# Patient Record
Sex: MALE | Race: WHITE | Employment: FULL TIME | URBAN - METROPOLITAN AREA
[De-identification: names, ages, dates, MRNs, and addresses within clinical notes are randomized per-mention and may not be internally consistent; named-entity substitution may affect disease eponyms.]

---

## 2022-05-22 ENCOUNTER — HOSPITAL ENCOUNTER (INPATIENT)
Age: 37
LOS: 1 days | Discharge: HOME | DRG: 660 | End: 2022-05-23
Attending: STUDENT IN AN ORGANIZED HEALTH CARE EDUCATION/TRAINING PROGRAM | Admitting: INTERNAL MEDICINE
Payer: COMMERCIAL

## 2022-05-22 ENCOUNTER — ANESTHESIA EVENT (OUTPATIENT)
Dept: OPERATING ROOM | Age: 37
DRG: 660 | End: 2022-05-22
Payer: COMMERCIAL

## 2022-05-22 ENCOUNTER — ANESTHESIA (OUTPATIENT)
Dept: OPERATING ROOM | Age: 37
DRG: 660 | End: 2022-05-22
Payer: COMMERCIAL

## 2022-05-22 ENCOUNTER — APPOINTMENT (OUTPATIENT)
Dept: CT IMAGING | Age: 37
DRG: 660 | End: 2022-05-22
Payer: COMMERCIAL

## 2022-05-22 DIAGNOSIS — N20.1 URETEROLITHIASIS: Primary | ICD-10-CM

## 2022-05-22 DIAGNOSIS — N17.9 AKI (ACUTE KIDNEY INJURY) (HCC): ICD-10-CM

## 2022-05-22 PROBLEM — N13.2 HYDRONEPHROSIS WITH RENAL CALCULOUS OBSTRUCTION: Status: ACTIVE | Noted: 2022-05-22

## 2022-05-22 LAB
ANION GAP SERPL CALCULATED.3IONS-SCNC: 14 MMOL/L (ref 3–16)
BASOPHILS ABSOLUTE: 0.1 K/UL (ref 0–0.2)
BASOPHILS RELATIVE PERCENT: 0.6 %
BILIRUBIN URINE: NEGATIVE
BLOOD, URINE: ABNORMAL
BUN BLDV-MCNC: 22 MG/DL (ref 7–20)
CALCIUM SERPL-MCNC: 9.6 MG/DL (ref 8.3–10.6)
CHLORIDE BLD-SCNC: 103 MMOL/L (ref 99–110)
CLARITY: CLEAR
CO2: 21 MMOL/L (ref 21–32)
COLOR: YELLOW
CREAT SERPL-MCNC: 1.7 MG/DL (ref 0.9–1.3)
EOSINOPHILS ABSOLUTE: 0.2 K/UL (ref 0–0.6)
EOSINOPHILS RELATIVE PERCENT: 2.2 %
GFR AFRICAN AMERICAN: 56
GFR NON-AFRICAN AMERICAN: 46
GLUCOSE BLD-MCNC: 116 MG/DL (ref 70–99)
GLUCOSE URINE: NEGATIVE MG/DL
HCT VFR BLD CALC: 39.1 % (ref 40.5–52.5)
HEMOGLOBIN: 13 G/DL (ref 13.5–17.5)
KETONES, URINE: NEGATIVE MG/DL
LEUKOCYTE ESTERASE, URINE: NEGATIVE
LYMPHOCYTES ABSOLUTE: 1.3 K/UL (ref 1–5.1)
LYMPHOCYTES RELATIVE PERCENT: 12 %
MCH RBC QN AUTO: 27.6 PG (ref 26–34)
MCHC RBC AUTO-ENTMCNC: 33.3 G/DL (ref 31–36)
MCV RBC AUTO: 82.8 FL (ref 80–100)
MICROSCOPIC EXAMINATION: YES
MONOCYTES ABSOLUTE: 0.5 K/UL (ref 0–1.3)
MONOCYTES RELATIVE PERCENT: 4.6 %
NEUTROPHILS ABSOLUTE: 8.4 K/UL (ref 1.7–7.7)
NEUTROPHILS RELATIVE PERCENT: 80.6 %
NITRITE, URINE: NEGATIVE
PDW BLD-RTO: 14.7 % (ref 12.4–15.4)
PH UA: 6.5 (ref 5–8)
PLATELET # BLD: 252 K/UL (ref 135–450)
PMV BLD AUTO: 7.9 FL (ref 5–10.5)
POTASSIUM REFLEX MAGNESIUM: 4.5 MMOL/L (ref 3.5–5.1)
PROTEIN UA: NEGATIVE MG/DL
RBC # BLD: 4.73 M/UL (ref 4.2–5.9)
RBC UA: ABNORMAL /HPF (ref 0–4)
SODIUM BLD-SCNC: 138 MMOL/L (ref 136–145)
SPECIFIC GRAVITY UA: 1.02 (ref 1–1.03)
URINE TYPE: ABNORMAL
UROBILINOGEN, URINE: 0.2 E.U./DL
WBC # BLD: 10.4 K/UL (ref 4–11)
WBC UA: ABNORMAL /HPF (ref 0–5)

## 2022-05-22 PROCEDURE — 96375 TX/PRO/DX INJ NEW DRUG ADDON: CPT

## 2022-05-22 PROCEDURE — 81001 URINALYSIS AUTO W/SCOPE: CPT

## 2022-05-22 PROCEDURE — 2580000003 HC RX 258: Performed by: UROLOGY

## 2022-05-22 PROCEDURE — 3600000014 HC SURGERY LEVEL 4 ADDTL 15MIN: Performed by: UROLOGY

## 2022-05-22 PROCEDURE — 96374 THER/PROPH/DIAG INJ IV PUSH: CPT

## 2022-05-22 PROCEDURE — 6360000002 HC RX W HCPCS: Performed by: ANESTHESIOLOGY

## 2022-05-22 PROCEDURE — 3700000001 HC ADD 15 MINUTES (ANESTHESIA): Performed by: UROLOGY

## 2022-05-22 PROCEDURE — 3700000000 HC ANESTHESIA ATTENDED CARE: Performed by: UROLOGY

## 2022-05-22 PROCEDURE — 74176 CT ABD & PELVIS W/O CONTRAST: CPT

## 2022-05-22 PROCEDURE — 2580000003 HC RX 258: Performed by: STUDENT IN AN ORGANIZED HEALTH CARE EDUCATION/TRAINING PROGRAM

## 2022-05-22 PROCEDURE — BT1D1ZZ FLUOROSCOPY OF RIGHT KIDNEY, URETER AND BLADDER USING LOW OSMOLAR CONTRAST: ICD-10-PCS | Performed by: UROLOGY

## 2022-05-22 PROCEDURE — 2580000003 HC RX 258: Performed by: INTERNAL MEDICINE

## 2022-05-22 PROCEDURE — 0T768DZ DILATION OF RIGHT URETER WITH INTRALUMINAL DEVICE, VIA NATURAL OR ARTIFICIAL OPENING ENDOSCOPIC: ICD-10-PCS | Performed by: UROLOGY

## 2022-05-22 PROCEDURE — 6360000002 HC RX W HCPCS: Performed by: STUDENT IN AN ORGANIZED HEALTH CARE EDUCATION/TRAINING PROGRAM

## 2022-05-22 PROCEDURE — 99285 EMERGENCY DEPT VISIT HI MDM: CPT

## 2022-05-22 PROCEDURE — 96361 HYDRATE IV INFUSION ADD-ON: CPT

## 2022-05-22 PROCEDURE — 3600000004 HC SURGERY LEVEL 4 BASE: Performed by: UROLOGY

## 2022-05-22 PROCEDURE — 7100000001 HC PACU RECOVERY - ADDTL 15 MIN: Performed by: UROLOGY

## 2022-05-22 PROCEDURE — 85025 COMPLETE CBC W/AUTO DIFF WBC: CPT

## 2022-05-22 PROCEDURE — 7100000000 HC PACU RECOVERY - FIRST 15 MIN: Performed by: UROLOGY

## 2022-05-22 PROCEDURE — 80048 BASIC METABOLIC PNL TOTAL CA: CPT

## 2022-05-22 PROCEDURE — C2617 STENT, NON-COR, TEM W/O DEL: HCPCS | Performed by: UROLOGY

## 2022-05-22 PROCEDURE — 2709999900 HC NON-CHARGEABLE SUPPLY: Performed by: UROLOGY

## 2022-05-22 PROCEDURE — 1200000000 HC SEMI PRIVATE

## 2022-05-22 DEVICE — STENT URET 6FR L28CM PERCFLX HYDR+ DBL PGTL THRD 2: Type: IMPLANTABLE DEVICE | Site: URETER | Status: FUNCTIONAL

## 2022-05-22 RX ORDER — SODIUM CHLORIDE 0.9 % (FLUSH) 0.9 %
5-40 SYRINGE (ML) INJECTION PRN
Status: DISCONTINUED | OUTPATIENT
Start: 2022-05-22 | End: 2022-05-23 | Stop reason: HOSPADM

## 2022-05-22 RX ORDER — SASSAFRAS
1 OIL (ML) MISCELLANEOUS
COMMUNITY

## 2022-05-22 RX ORDER — DIPHENHYDRAMINE HYDROCHLORIDE 50 MG/ML
12.5 INJECTION INTRAMUSCULAR; INTRAVENOUS
Status: ACTIVE | OUTPATIENT
Start: 2022-05-22 | End: 2022-05-22

## 2022-05-22 RX ORDER — ONDANSETRON 2 MG/ML
4 INJECTION INTRAMUSCULAR; INTRAVENOUS EVERY 6 HOURS PRN
Status: DISCONTINUED | OUTPATIENT
Start: 2022-05-22 | End: 2022-05-23 | Stop reason: HOSPADM

## 2022-05-22 RX ORDER — OXYCODONE HYDROCHLORIDE 5 MG/1
10 TABLET ORAL PRN
Status: ACTIVE | OUTPATIENT
Start: 2022-05-22 | End: 2022-05-22

## 2022-05-22 RX ORDER — LABETALOL HYDROCHLORIDE 5 MG/ML
10 INJECTION, SOLUTION INTRAVENOUS
Status: DISCONTINUED | OUTPATIENT
Start: 2022-05-22 | End: 2022-05-23 | Stop reason: HOSPADM

## 2022-05-22 RX ORDER — KETOROLAC TROMETHAMINE 30 MG/ML
15 INJECTION, SOLUTION INTRAMUSCULAR; INTRAVENOUS ONCE
Status: COMPLETED | OUTPATIENT
Start: 2022-05-22 | End: 2022-05-22

## 2022-05-22 RX ORDER — MAGNESIUM HYDROXIDE 1200 MG/15ML
LIQUID ORAL PRN
Status: DISCONTINUED | OUTPATIENT
Start: 2022-05-22 | End: 2022-05-22 | Stop reason: HOSPADM

## 2022-05-22 RX ORDER — HYDRALAZINE HYDROCHLORIDE 20 MG/ML
10 INJECTION INTRAMUSCULAR; INTRAVENOUS
Status: DISCONTINUED | OUTPATIENT
Start: 2022-05-22 | End: 2022-05-23 | Stop reason: HOSPADM

## 2022-05-22 RX ORDER — OXYCODONE HYDROCHLORIDE 5 MG/1
5 TABLET ORAL PRN
Status: ACTIVE | OUTPATIENT
Start: 2022-05-22 | End: 2022-05-22

## 2022-05-22 RX ORDER — SODIUM CHLORIDE 0.9 % (FLUSH) 0.9 %
5-40 SYRINGE (ML) INJECTION EVERY 12 HOURS SCHEDULED
Status: DISCONTINUED | OUTPATIENT
Start: 2022-05-22 | End: 2022-05-23 | Stop reason: HOSPADM

## 2022-05-22 RX ORDER — ACETAMINOPHEN 650 MG/1
650 SUPPOSITORY RECTAL EVERY 6 HOURS PRN
Status: DISCONTINUED | OUTPATIENT
Start: 2022-05-22 | End: 2022-05-23 | Stop reason: HOSPADM

## 2022-05-22 RX ORDER — ONDANSETRON 2 MG/ML
INJECTION INTRAMUSCULAR; INTRAVENOUS PRN
Status: DISCONTINUED | OUTPATIENT
Start: 2022-05-22 | End: 2022-05-22 | Stop reason: SDUPTHER

## 2022-05-22 RX ORDER — M-VIT,TX,IRON,MINS/CALC/FOLIC 27MG-0.4MG
1 TABLET ORAL DAILY
COMMUNITY

## 2022-05-22 RX ORDER — METOCLOPRAMIDE HYDROCHLORIDE 5 MG/ML
10 INJECTION INTRAMUSCULAR; INTRAVENOUS
Status: ACTIVE | OUTPATIENT
Start: 2022-05-22 | End: 2022-05-22

## 2022-05-22 RX ORDER — SODIUM CHLORIDE, SODIUM LACTATE, POTASSIUM CHLORIDE, CALCIUM CHLORIDE 600; 310; 30; 20 MG/100ML; MG/100ML; MG/100ML; MG/100ML
INJECTION, SOLUTION INTRAVENOUS CONTINUOUS
Status: DISCONTINUED | OUTPATIENT
Start: 2022-05-22 | End: 2022-05-23 | Stop reason: HOSPADM

## 2022-05-22 RX ORDER — SODIUM CHLORIDE 9 MG/ML
25 INJECTION, SOLUTION INTRAVENOUS PRN
Status: DISCONTINUED | OUTPATIENT
Start: 2022-05-22 | End: 2022-05-23 | Stop reason: HOSPADM

## 2022-05-22 RX ORDER — ONDANSETRON 2 MG/ML
4 INJECTION INTRAMUSCULAR; INTRAVENOUS ONCE
Status: COMPLETED | OUTPATIENT
Start: 2022-05-22 | End: 2022-05-22

## 2022-05-22 RX ORDER — MEPERIDINE HYDROCHLORIDE 25 MG/ML
12.5 INJECTION INTRAMUSCULAR; INTRAVENOUS; SUBCUTANEOUS EVERY 5 MIN PRN
Status: DISCONTINUED | OUTPATIENT
Start: 2022-05-22 | End: 2022-05-23 | Stop reason: HOSPADM

## 2022-05-22 RX ORDER — IBUPROFEN 400 MG/1
400 TABLET ORAL EVERY 6 HOURS PRN
COMMUNITY

## 2022-05-22 RX ORDER — CEFAZOLIN SODIUM 1 G/3ML
INJECTION, POWDER, FOR SOLUTION INTRAMUSCULAR; INTRAVENOUS PRN
Status: DISCONTINUED | OUTPATIENT
Start: 2022-05-22 | End: 2022-05-22 | Stop reason: SDUPTHER

## 2022-05-22 RX ORDER — POLYETHYLENE GLYCOL 3350 17 G/17G
17 POWDER, FOR SOLUTION ORAL DAILY PRN
Status: DISCONTINUED | OUTPATIENT
Start: 2022-05-22 | End: 2022-05-23 | Stop reason: HOSPADM

## 2022-05-22 RX ORDER — PROPOFOL 10 MG/ML
INJECTION, EMULSION INTRAVENOUS PRN
Status: DISCONTINUED | OUTPATIENT
Start: 2022-05-22 | End: 2022-05-22 | Stop reason: SDUPTHER

## 2022-05-22 RX ORDER — SODIUM CHLORIDE 9 MG/ML
INJECTION, SOLUTION INTRAVENOUS PRN
Status: DISCONTINUED | OUTPATIENT
Start: 2022-05-22 | End: 2022-05-23 | Stop reason: HOSPADM

## 2022-05-22 RX ORDER — ACETAMINOPHEN 325 MG/1
650 TABLET ORAL EVERY 6 HOURS PRN
Status: DISCONTINUED | OUTPATIENT
Start: 2022-05-22 | End: 2022-05-23 | Stop reason: HOSPADM

## 2022-05-22 RX ORDER — ONDANSETRON 4 MG/1
4 TABLET, ORALLY DISINTEGRATING ORAL EVERY 8 HOURS PRN
Status: DISCONTINUED | OUTPATIENT
Start: 2022-05-22 | End: 2022-05-23 | Stop reason: HOSPADM

## 2022-05-22 RX ORDER — SODIUM CHLORIDE, SODIUM LACTATE, POTASSIUM CHLORIDE, AND CALCIUM CHLORIDE .6; .31; .03; .02 G/100ML; G/100ML; G/100ML; G/100ML
1000 INJECTION, SOLUTION INTRAVENOUS ONCE
Status: COMPLETED | OUTPATIENT
Start: 2022-05-22 | End: 2022-05-22

## 2022-05-22 RX ADMIN — SODIUM CHLORIDE, POTASSIUM CHLORIDE, SODIUM LACTATE AND CALCIUM CHLORIDE: 600; 310; 30; 20 INJECTION, SOLUTION INTRAVENOUS at 17:59

## 2022-05-22 RX ADMIN — ONDANSETRON 4 MG: 2 INJECTION INTRAMUSCULAR; INTRAVENOUS at 13:40

## 2022-05-22 RX ADMIN — KETOROLAC TROMETHAMINE 15 MG: 30 INJECTION, SOLUTION INTRAMUSCULAR; INTRAVENOUS at 08:49

## 2022-05-22 RX ADMIN — ONDANSETRON 4 MG: 2 INJECTION INTRAMUSCULAR; INTRAVENOUS at 08:48

## 2022-05-22 RX ADMIN — SODIUM CHLORIDE, POTASSIUM CHLORIDE, SODIUM LACTATE AND CALCIUM CHLORIDE: 600; 310; 30; 20 INJECTION, SOLUTION INTRAVENOUS at 12:41

## 2022-05-22 RX ADMIN — Medication 5 ML: at 21:19

## 2022-05-22 RX ADMIN — HYDROMORPHONE HYDROCHLORIDE 1 MG: 1 INJECTION, SOLUTION INTRAMUSCULAR; INTRAVENOUS; SUBCUTANEOUS at 10:19

## 2022-05-22 RX ADMIN — SODIUM CHLORIDE, POTASSIUM CHLORIDE, SODIUM LACTATE AND CALCIUM CHLORIDE 1000 ML: 600; 310; 30; 20 INJECTION, SOLUTION INTRAVENOUS at 08:48

## 2022-05-22 RX ADMIN — PROPOFOL 300 MG: 10 INJECTION, EMULSION INTRAVENOUS at 13:22

## 2022-05-22 RX ADMIN — CEFAZOLIN SODIUM 2000 MG: 1 POWDER, FOR SOLUTION INTRAMUSCULAR; INTRAVENOUS at 13:32

## 2022-05-22 ASSESSMENT — PAIN - FUNCTIONAL ASSESSMENT
PAIN_FUNCTIONAL_ASSESSMENT: ACTIVITIES ARE NOT PREVENTED
PAIN_FUNCTIONAL_ASSESSMENT: PREVENTS OR INTERFERES SOME ACTIVE ACTIVITIES AND ADLS
PAIN_FUNCTIONAL_ASSESSMENT: 0-10
PAIN_FUNCTIONAL_ASSESSMENT: 0-10
PAIN_FUNCTIONAL_ASSESSMENT: ACTIVITIES ARE NOT PREVENTED

## 2022-05-22 ASSESSMENT — PAIN DESCRIPTION - DESCRIPTORS
DESCRIPTORS: STABBING;SHARP
DESCRIPTORS: ACHING;SHARP;SHOOTING
DESCRIPTORS: ACHING
DESCRIPTORS: ACHING

## 2022-05-22 ASSESSMENT — PAIN SCALES - GENERAL
PAINLEVEL_OUTOF10: 0
PAINLEVEL_OUTOF10: 0
PAINLEVEL_OUTOF10: 3
PAINLEVEL_OUTOF10: 9
PAINLEVEL_OUTOF10: 0

## 2022-05-22 ASSESSMENT — PAIN DESCRIPTION - LOCATION
LOCATION: FLANK
LOCATION: BACK
LOCATION: FLANK
LOCATION: FLANK

## 2022-05-22 ASSESSMENT — PAIN DESCRIPTION - PAIN TYPE
TYPE: ACUTE PAIN
TYPE: ACUTE PAIN
TYPE: ACUTE PAIN;SURGICAL PAIN
TYPE: ACUTE PAIN

## 2022-05-22 ASSESSMENT — PAIN DESCRIPTION - FREQUENCY
FREQUENCY: CONTINUOUS
FREQUENCY: CONTINUOUS
FREQUENCY: INTERMITTENT

## 2022-05-22 ASSESSMENT — PAIN DESCRIPTION - ORIENTATION
ORIENTATION: RIGHT

## 2022-05-22 ASSESSMENT — PAIN DESCRIPTION - ONSET: ONSET: ON-GOING

## 2022-05-22 NOTE — PROGRESS NOTES
PACU Transfer Note    Vitals:    05/22/22 1400   BP: 128/78   Pulse: 75   Resp: 21   Temp:    SpO2: 98%       In: 400 [I.V.:400]  Out: -     Pain assessment: VS stable. NO pain no nausea. Report called to [address] Patient to transfer to room # 5880 as scheduled.    Pain Level: 0    Report given to Receiving unit RN.    5/22/2022 2:09 PM

## 2022-05-22 NOTE — PROGRESS NOTES
Received pt from ed [facility], complains of 3/10 right lower back pain and that dilaudid was helpful in relieving pain earlier. Discussed care plan with pt and that he is to have cystoscopy this afternoon- pt verbalized understanding. Will monitor.

## 2022-05-22 NOTE — ANESTHESIA POSTPROCEDURE EVALUATION
Department of Anesthesiology  Postprocedure Note    Patient: Lorna Cameron  MRN: [id number]  YOB: 1985  Date of evaluation: 5/22/2022  Time:  1:59 PM     Procedure Summary     Date: 05/22/22 Room / Location: [address] [place] [address]    Anesthesia Start: 1890 Anesthesia Stop: 6691    Procedure: CYSTOSCOPY URETERAL STENT INSERTION RIGHT RIGHT RETROGRADE POYELOGRAM (Right ) Diagnosis:       Hydronephrosis with urinary obstruction due to renal calculus      (acute renal failure with obstructing stone)    Surgeons: Bethany Melendez MD Responsible Provider: Imer Jara MD    Anesthesia Type: general ASA Status: 3 - Emergent          Anesthesia Type: No value filed. Juju Phase I:      Juju Phase II:      Last vitals: Reviewed and per EMR flowsheets.        Anesthesia Post Evaluation    Patient location during evaluation: PACU  Patient participation: complete - patient participated  Level of consciousness: awake and alert  Pain score: 0  Airway patency: patent  Nausea & Vomiting: no nausea and no vomiting  Complications: no  Cardiovascular status: hemodynamically stable  Respiratory status: acceptable  Hydration status: euvolemic

## 2022-05-22 NOTE — PLAN OF CARE
Problem: Pain  Goal: Verbalizes/displays adequate comfort level or baseline comfort level  Outcome: Progressing  Note: Pt rates pain 3/10 right lower back, declines need for pain meds at this time. Will monitor. Problem: Safety - Adult  Goal: Free from fall injury  Note: Discussed with pt importance to keep bed low and locked with alarm activated, nonskid socks on when out of bed, call light and belongings within reach- will monitor.

## 2022-05-22 NOTE — ANESTHESIA PRE PROCEDURE
Department of Anesthesiology  Preprocedure Note       Name:  Rona Bains   Age:  31 y.o.  :  1985                                          MRN:  [id number]         Date:  2022      Surgeon: Odalys Henderson):  Mikki Soliz MD    Procedure: Procedure(s):  CYSTOSCOPY URETERAL STENT INSERTION RIGHT    Medications prior to admission:   Prior to Admission medications    Medication Sig Start Date End Date Taking?  Authorizing Provider   Multiple Vitamins-Minerals (THERAPEUTIC MULTIVITAMIN-MINERALS) tablet Take 1 tablet by mouth daily   Yes Historical Provider, MD   ibuprofen (ADVIL;MOTRIN) 400 MG tablet Take 400 mg by mouth every 6 hours as needed for Pain   Yes Historical Provider, MD   Sassafras Oil OIL Take 1 capsule by mouth Sassafras pill   Yes Historical Provider, MD       Current medications:    Current Facility-Administered Medications   Medication Dose Route Frequency Provider Last Rate Last Admin    lactated ringers infusion   IntraVENous Continuous Lucillie Simmonds,  mL/hr at 22 1241 New Bag at 22 1241    sodium chloride flush 0.9 % injection 5-40 mL  5-40 mL IntraVENous 2 times per day Lucillie Simmonds, MD        sodium chloride flush 0.9 % injection 5-40 mL  5-40 mL IntraVENous PRN Lucillie Simmonds, MD        0.9 % sodium chloride infusion   IntraVENous PRN Lucillie Simmonds, MD        ondansetron (ZOFRAN-ODT) disintegrating tablet 4 mg  4 mg Oral Q8H PRN Lucillie Simmonds, MD        Or    ondansetron [facility]) injection 4 mg  4 mg IntraVENous Q6H PRN Lucillie Simmonds, MD        polyethylene glycol (GLYCOLAX) packet 17 g  17 g Oral Daily PRN Lucillie Simmonds, MD        acetaminophen (TYLENOL) tablet 650 mg  650 mg Oral Q6H PRN Lucillie Simmonds, MD        Or    acetaminophen (TYLENOL) suppository 650 mg  650 mg Rectal Q6H PRN Lucillie Simmonds, MD        HYDROmorphone (DILAUDID) injection 0.5 mg  0.5 mg IntraVENous Q3H PRN Lucillie Simmonds, MD           Allergies:  No Known Allergies    Problem List: Patient Active Problem List   Diagnosis Code    Hydronephrosis with renal calculous obstruction N13.2       Past Medical History:  History reviewed. No pertinent past medical history. Past Surgical History:  History reviewed. No pertinent surgical history. Social History:    Social History     Tobacco Use    Smoking status: Never Smoker    Smokeless tobacco: Never Used   Substance Use Topics    Alcohol use: Never                                Counseling given: Not Answered      Vital Signs (Current):   Vitals:    05/22/22 0900 05/22/22 0930 05/22/22 1129 05/22/22 1155   BP: (!) 140/83 (!) 151/74 139/83 130/82   Pulse: 69 67 73 64   Resp: 10 21  18   Temp:    98.1 °F (36.7 °C)   TempSrc:    Oral   SpO2: 92% 96%  95%   Weight:    (!) 326 lb 11.2 oz (148.2 kg)   Height:                                                  BP Readings from Last 3 Encounters:   05/22/22 130/82       NPO Status:                                                                                 BMI:   Wt Readings from Last 3 Encounters:   05/22/22 (!) 326 lb 11.2 oz (148.2 kg)     Body mass index is 41.95 kg/m². CBC:   Lab Results   Component Value Date    WBC 10.4 05/22/2022    RBC 4.73 05/22/2022    HGB 13.0 05/22/2022    HCT 39.1 05/22/2022    MCV 82.8 05/22/2022    RDW 14.7 05/22/2022     05/22/2022       CMP:   Lab Results   Component Value Date     05/22/2022    K 4.5 05/22/2022     05/22/2022    CO2 21 05/22/2022    BUN 22 05/22/2022    CREATININE 1.7 05/22/2022    GFRAA 56 05/22/2022    LABGLOM 46 05/22/2022    GLUCOSE 116 05/22/2022    CALCIUM 9.6 05/22/2022       POC Tests: No results for input(s): POCGLU, POCNA, POCK, POCCL, POCBUN, POCHEMO, POCHCT in the last 72 hours.     Coags: No results found for: PROTIME, INR, APTT    HCG (If Applicable): No results found for: PREGTESTUR, PREGSERUM, HCG, HCGQUANT     ABGs: No results found for: PHART, PO2ART, DVH3FVI, CHD6PBR, BEART, S8SAPEVE     Type & Screen (If Applicable):  No results found for: LABABO, LABRH    Drug/Infectious Status (If Applicable):  No results found for: HIV, HEPCAB    COVID-19 Screening (If Applicable): No results found for: COVID19        Anesthesia Evaluation  Patient summary reviewed and Nursing notes reviewed no history of anesthetic complications:   Airway: Mallampati: II  TM distance: >3 FB   Neck ROM: full  Mouth opening: > = 3 FB   Dental:          Pulmonary:Negative Pulmonary ROS                              Cardiovascular:Negative CV ROS                      Neuro/Psych:   Negative Neuro/Psych ROS              GI/Hepatic/Renal:   (+) morbid obesity          Endo/Other:                     Abdominal:             Vascular: negative vascular ROS. Other Findings:           Anesthesia Plan      general     ASA 3 - emergent     (81-year-old male presents for [address] Plan general anesthesia with ASA standard monitors. Questions answered. Patient agreeable with anesthetic plan.  )  Induction: intravenous. Anesthetic plan and risks discussed with patient.         Attending anesthesiologist reviewed and agrees with Angela Salomon MD   5/22/2022

## 2022-05-22 NOTE — CONSULTS
Urology Attending Consult Note      Reason for Consultation: Acute renal failure obstructing right-sided stone    History: 26-year-old gentleman visiting from [place]. He presented several months ago with a small 3 mm ureteral stone that he may have passed. He did see a urologist in [place] at that time. He represents about 5 months later with a 5 mm mid ureteral stone with hydronephrosis. Additionally has acute renal failure with a creatinine of 1.7    Family History, Social History, Review of Systems:  Reviewed and agreed to as per chart    Vitals:  BP (!) 151/74   Pulse 67   Temp 97.9 °F (36.6 °C) (Oral)   Resp 21   Ht 6' 2\" (1.88 m)   Wt (!) 315 lb (142.9 kg)   SpO2 96%   BMI 40.44 kg/m²   Temp  Av.9 °F (36.6 °C)  Min: 97.9 °F (36.6 °C)  Max: 97.9 °F (36.6 °C)  No intake or output data in the 24 hours ending 22 1105      Physical:   Well developed, well nourished in no acute distress   Mood indicates no abnormalities. Pt doesnt appear depressed   Orientated to time and place   Neck is supple, trachea is midline   Respiratory effort is normal   Cardiovascular show no extremity swelling   Abdomen no masses or hernias are palpated, there is no tenderness. Liver and Spleen appear normal.   Skin show no abnormal lesions   Lymph nodes are not palpated in the inguinal, neck, or axillary area. Male :   Penis appears normal and circumcised   Urethral meatus is normal in size and location   Scrotum appears normal and both testicles appear normal in size and location   Sphincter has good tone   Anus is inspected.  There are no perineal masses  Prostate is not examined,    Labs:  WBC:    Lab Results   Component Value Date    WBC 10.4 2022     Hemoglobin/Hematocrit:    Lab Results   Component Value Date    HGB 13.0 2022    HCT 39.1 2022     BMP:    Lab Results   Component Value Date     2022    K 4.5 2022     2022    CO2 21 05/22/2022    BUN 22 05/22/2022    CREATININE 1.7 05/22/2022    CALCIUM 9.6 05/22/2022    GFRAA 56 05/22/2022    LABGLOM 46 05/22/2022     PT/INR:  No results found for: PROTIME, INR  PTT:  No results found for: APTT[APTT    Urinalysis: Positive for RBCs, nitrite negative    Urine Culture: Not applicable    Blood Culture:  Not Applicable    Antibiotic Therapy: Not applicable    Imaging: CT scan    Impression:       1. 0.4 cm right ureteric calculus with moderate hydroureteronephrosis. 2. Hyperattenuating right renal lesion nonspecific but statistically likely to represent a hemorrhagic cyst. Recommend follow-up with renal ultrasound for confirmation. Impression/Plan: 29-year-old gentleman with acute renal failure and obstructing mid right ureteral calculus  -We will take the OR today for a cystoscopy right stent placement to relieve the pain and acute obstruction causing renal failure. -He will need a delayed right ureteroscopy once recovered as an outpatient.   This can be done with urology group here in  [place] or back in [place] [address] admitting overnight to make sure that his renal function improves    Raymond Cifuentes MD

## 2022-05-22 NOTE — PROGRESS NOTES
Patient admitted to PACU. Post op   Date: 05/22/22 Room / Location: [address] [place] [address] / [facility]   Anesthesia Start: 1318 Anesthesia Stop:    Procedure: CYSTOSCOPY URETERAL STENT INSERTION RIGHT RIGHT RETROGRADE POYELOGRAM (Right ) Diagnosis:        Hydronephrosis with urinary obstruction due to renal calculus       (acute renal failure with obstructing stone)   Surgeons: Quan Dior MD Responsible Provider: Miriam Peralta MD   Anesthesia Type: Not recorded ASA Status     Report received from anesthesia personnel- no problems noted during the case. Patient awake- no complaints of pain or nausea. No meatal drainage noted. Good peripheral pulses.

## 2022-05-22 NOTE — CARE COORDINATION
DARYL following for  D/c planning:    -  Patient indep up ad [place]: Here [place] vacation, presented to  ED:  W/  Chief Complaint:  Right sided flank pain, vomiting    - CTAP in the emergency room and was found to have obstructive ureteral stone with right-sided hydronephrosis  -  Urology was consulted in the emergency room and took patient for cystoscopy with ureteral stent placement    Admitted  For  Pain control  Nausea; Will need follow-up as outpatient  Patient has urologist in [place]  No SW needs at this time  Will cont to follow     Electronically signed by Yun Mccarthy RN on 5/22/2022 at 4:53 PM         Yun Mccarthy RN Case Manager  [facility]  [address] [place] [address].   [facility] [address]  [phone]  Fax [phone]

## 2022-05-22 NOTE — ED PROVIDER NOTES
[facility]          ATTENDING PHYSICIAN NOTE       Date of evaluation: 5/22/2022    Chief Complaint     Flank Pain (hx of kidney stones, R flank pain. states he feels like it is kidney stones again +nausea and vomiting)      History of Present Illness     Marquise Alfred is a 31 y.o. male who presents with right-sided flank pain. He states he had a similar episode in February where he was diagnosed with a kidney stone. He is from [place] and visiting the [facility]. He has associated vomiting and nausea from his pain. It is constantly there but intermittently gets worse. He finds it difficult to sit still. He denies any chest pain or shortness of breath. Denies fevers    He states he is otherwise healthy    Review of Systems     Positive for: Right flank pain, vomiting, nausea  Negative for: Chest pain, headache    Other systems reviewed and negative. Past Medical, Surgical, Family, and Social History     He has no past medical history on file. He has no past surgical history on file. His family history is not on file. He reports that he has never smoked. He has never used smokeless tobacco. He reports that he does not drink alcohol and does not use drugs. Medications     Previous Medications    IBUPROFEN (ADVIL;MOTRIN) 400 MG TABLET    Take 400 mg by mouth every 6 hours as needed for Pain    MULTIPLE VITAMINS-MINERALS (THERAPEUTIC MULTIVITAMIN-MINERALS) TABLET    Take 1 tablet by mouth daily       Allergies     He has No Known Allergies.     Physical Exam     INITIAL VITALS: BP: (!) 143/84, Temp: 97.9 °F (36.6 °C), Pulse: 66, Resp: 20, SpO2: 98 %     General: nontoxic, no acute distress, appears mildly uncomfortable  HEENT: NCAT, EOMI grossly intact, external nose normal, no stridor  Neck: supple, no pain with movement  Cardiac: normal rate, regular rhythm, well perfused  Respiratory:  no respiratory distress, no cough  Abdominal: soft, nontender to palpation, no rebound tenderness. Mild right-sided CVA tenderness, none on left side  Extremities: no pitting edema  Musculoskeletal: no long bone deformities  Skin: no diaphoresis, no rash  Neuro: alert and oriented, moving extremities symmetrically, clear speech  Psych: appropriate mood, normal affect    Diagnostic Results     EKG    N/A    RADIOLOGY:  CT ABDOMEN PELVIS WO CONTRAST Additional Contrast? None   Final Result   Impression:      1. 0.4 cm right ureteric calculus with moderate hydroureteronephrosis. 2. Hyperattenuating right renal lesion nonspecific but statistically likely to represent a hemorrhagic cyst. Recommend follow-up with renal ultrasound for confirmation.           LABS:   Results for orders placed or performed during the hospital encounter of 05/22/22   CBC with Auto Differential   Result Value Ref Range    WBC 10.4 4.0 - 11.0 K/uL    RBC 4.73 4.20 - 5.90 M/uL    Hemoglobin 13.0 (L) 13.5 - 17.5 g/dL    Hematocrit 39.1 (L) 40.5 - 52.5 %    MCV 82.8 80.0 - 100.0 fL    MCH 27.6 26.0 - 34.0 pg    MCHC 33.3 31.0 - 36.0 g/dL    RDW 14.7 12.4 - 15.4 %    Platelets 607 384 - 503 K/uL    MPV 7.9 5.0 - 10.5 fL    Neutrophils % 80.6 %    Lymphocytes % 12.0 %    Monocytes % 4.6 %    Eosinophils % 2.2 %    Basophils % 0.6 %    Neutrophils Absolute 8.4 (H) 1.7 - 7.7 K/uL    Lymphocytes Absolute 1.3 1.0 - 5.1 K/uL    Monocytes Absolute 0.5 0.0 - 1.3 K/uL    Eosinophils Absolute 0.2 0.0 - 0.6 K/uL    Basophils Absolute 0.1 0.0 - 0.2 K/uL   Basic Metabolic Panel w/ Reflex to MG   Result Value Ref Range    Sodium 138 136 - 145 mmol/L    Potassium reflex Magnesium 4.5 3.5 - 5.1 mmol/L    Chloride 103 99 - 110 mmol/L    CO2 21 21 - 32 mmol/L    Anion Gap 14 3 - 16    Glucose 116 (H) 70 - 99 mg/dL    BUN 22 (H) 7 - 20 mg/dL    CREATININE 1.7 (H) 0.9 - 1.3 mg/dL    GFR Non- 46 (A) >60    GFR  56 (A) >60    Calcium 9.6 8.3 - 10.6 mg/dL   Urinalysis with Microscopic   Result Value Ref Range Color, UA Yellow Straw/Yellow    Clarity, UA Clear Clear    Glucose, Ur Negative Negative mg/dL    Bilirubin Urine Negative Negative    Ketones, Urine Negative Negative mg/dL    Specific Gravity, UA 1.025 1.005 - 1.030    Blood, Urine MODERATE (A) Negative    pH, UA 6.5 5.0 - 8.0    Protein, UA Negative Negative mg/dL    Urobilinogen, Urine 0.2 <2.0 E.U./dL    Nitrite, Urine Negative Negative    Leukocyte Esterase, Urine Negative Negative    Microscopic Examination YES     Urine Type NotGiven     WBC, UA None seen 0 - 5 /HPF    RBC, UA 11-20 (A) 0 - 4 /HPF       ED BEDSIDE ULTRASOUND:   N/A    RECENT VITALS:  BP: 139/83,Temp: 97.9 °F (36.6 °C), Pulse: 73, Resp: 21, SpO2: 96 %     Procedures      N/A    ED Course     Nursing Notes, Past Medical Hx, Past Surgical Hx, Social Hx,Allergies, and Family Hx were reviewed. patient was given the following medications:  Orders Placed This Encounter   Medications    lactated ringers bolus    ondansetron (ZOFRAN) injection 4 mg    ketorolac (TORADOL) injection 15 mg    HYDROmorphone (DILAUDID) injection 1 mg       CONSULTS:  IP CONSULT TO UROLOGY  IP CONSULT TO HOSPITALIST    MEDICAL DECISIONMAKING / ASSESSMENT / Lamin Sebas is a 31 y.o. male with symptoms consistent with a kidney stone. Laboratory work-up was started and we discussed CT imaging. We decided proceed with CT imaging for better characterization of the stone. He was very uncomfortable so initial Toradol, fluids, Zofran was ordered. He was found to have KASSIE, so we will avoid any further NSAIDs. His pain was persistent so he treated with Dilaudid this time. No prior lab values for creatinine appearance, however he states he is otherwise healthy. I believe this creatinine 1.7 is new and likely represents KASSIE in the setting of a right-sided kidney stone with moderate hydro. No signs of infection on UA. He has no leukocytosis.     Appreciate consultation with Dr. Dimple Rodriguez with urology who agrees that patient will likely require intervention. He plans to do a stent today if possible. Will discuss with hospitalist for admission. Clinical Impression     1. Ureterolithiasis    2. KASSIE (acute kidney injury) ([facility] [address]        Disposition     PATIENT REFERRED TO:  No follow-up provider specified.     DISCHARGE MEDICATIONS:  New Prescriptions    No medications on file       DISPOSITION Admitted 05/22/2022 10:41:52 AM     Hugo Rothman MD  05/22/22 1149

## 2022-05-22 NOTE — PROGRESS NOTES
4 Eyes Admission Assessment     I agree as the admission nurse that 2 RN's have performed a thorough Head to Toe Skin Assessment on the patient. ALL assessment sites listed below have been assessed on admission. Areas assessed by both nurses:   [x]   Head, Face, and Ears   [x]   Shoulders, Back, and Chest  [x]   Arms, Elbows, and Hands   [x]   Coccyx, Sacrum, and Ischium  [x]   Legs, Feet, and Heels        Does the Patient have Skin Breakdown?   No         Tahir Prevention initiated:  No   Wound Care Orders initiated:  No      [facility] nurse consulted for Pressure Injury (Stage 3,4, Unstageable, DTI, NWPT, and Complex wounds) or Tahir score 18 or lower:  No      Nurse 1 eSignature: Electronically signed by Clarisse Palmer RN on 5/22/22 at 1:13 PM EDT    **SHARE this note so that the co-signing nurse is able to place an eSignature**    Nurse 2 eSignature: Electronically signed by Leann Deleon RN on 5/22/22 at 1:24 PM EDT

## 2022-05-22 NOTE — H&P
Hospital Medicine History & Physical      PCP: No primary care provider on file. Date of Admission: 5/22/2022    Date of Service: Pt seen/examined on 5/22/2022 and Admitted to Inpatient with expected LOS greater than two midnights due to medical therapy. Chief Complaint:  Right sided flank pain, vomiting      History Of Present Illness: The patient is a 31 y.o. male with medical history of kidney stones who presents to [facility] with 1 day history of severe right-sided flank pain associated with nausea and vomiting. Patient underwent CTAP in the emergency room and was found to have obstructive ureteral stone with right-sided hydronephrosis. Urology was consulted in the emergency room and took patient for cystoscopy with ureteral stent placement. Patient resides in [place] and here on vacation. Past Medical History:    Kidney stones    Past Surgical History:    History reviewed. No pertinent surgical history. Medications Prior to Admission:    Prior to Admission medications    Medication Sig Start Date End Date Taking? Authorizing Provider   Multiple Vitamins-Minerals (THERAPEUTIC MULTIVITAMIN-MINERALS) tablet Take 1 tablet by mouth daily   Yes Historical Provider, MD   ibuprofen (ADVIL;MOTRIN) 400 MG tablet Take 400 mg by mouth every 6 hours as needed for Pain   Yes Historical Provider, MD   Sassafras Oil OIL Take 1 capsule by mouth Sassafras pill   Yes Historical Provider, MD       Allergies:  Patient has no known allergies. Social History:  The patient currently lives at home    TOBACCO:   reports that he has never smoked. He has never used smokeless tobacco.  ETOH:   reports no history of alcohol use. Family History:  Reviewed in detail and negative for DM, Early CAD, Cancer, CVA. Positive as follows:    GrandFather has renal cell cancer    REVIEW OF SYSTEMS:   Positive and negative as noted in the HPI. All other systems reviewed and negative.     PHYSICAL EXAM:    BP 121/78   Pulse 67   Temp 98 °F (36.7 °C) (Oral)   Resp 17   Ht 6' 2\" (1.88 m)   Wt (!) 326 lb 11.2 oz (148.2 kg)   SpO2 94%   BMI 41.95 kg/m²     General appearance: No apparent distress appears stated age and cooperative. HEENT Normal cephalic, atraumatic without obvious deformity. Conjunctivae/corneas clear. Neck: Supple, No jugular venous distention/bruits. Trachea midline without thyromegaly or adenopathy with full range of motion. Lungs: Clear to auscultation, bilaterally without Rales/Wheezes/Rhonchi with good respiratory effort. Heart: Regular rate and rhythm with Normal S1/S2 without murmurs, rubs or gallops, point of maximum impulse non-displaced  Abdomen: Soft, non-tender or non-distended without rigidity or guarding and positive bowel sounds all four quadrants. Extremities: No clubbing, cyanosis, or edema bilaterally. Skin: Skin color, texture, turgor normal.    Neurologic: Alert and oriented X 3,  grossly non-focal.  Mental status: Alert, oriented, thought content appropriate. Capillary refill is brisk  Peripheral pulses     CT A/P:  1. 0.4 cm right ureteric calculus with moderate hydroureteronephrosis. 2. Hyperattenuating right renal lesion nonspecific but statistically likely to represent a hemorrhagic cyst. Recommend follow-up with renal ultrasound for confirmation.           CBC   Recent Labs     05/22/22  0840   WBC 10.4   HGB 13.0*   HCT 39.1*         RENAL  Recent Labs     05/22/22  0840      K 4.5      CO2 21   BUN 22*   CREATININE 1.7*     LFT'S  No results for input(s): AST, ALT, ALB, BILIDIR, BILITOT, ALKPHOS in the last 72 hours. COAG  No results for input(s): INR in the last 72 hours. CARDIAC ENZYMES  No results for input(s): CKTOTAL, CKMB, CKMBINDEX, TROPONINI in the last 72 hours.     U/A:    Lab Results   Component Value Date    COLORU Yellow 05/22/2022    WBCUA None seen 05/22/2022    RBCUA 11-20 05/22/2022    CLARITYU Clear 05/22/2022    SPECGRAV 1.025 05/22/2022    LEUKOCYTESUR Negative 05/22/2022    BLOODU MODERATE 05/22/2022    GLUCOSEU Negative 05/22/2022       ABG  No results found for: WAU7IUY, BEART, B9DQDUVH, PHART, THGBART, UDJ3XXV, PO2ART, QDQ0PHZ        Active Hospital Problems    Diagnosis Date Noted    Hydronephrosis with renal calculous obstruction [N13.2] 05/22/2022     Priority: Medium         ASSESSMENT/PLAN:    KASSIE with obstructive renal calculi:  Status post cystoscopy with ureteral stent placement  Continue with aggressive hydration  Monitor resolution of hematuria  Repeat creatinine in the morning  Avoid NSAIDs and other nephrotoxins  Pain control as needed, nausea control as needed    Hemorrhagic renal cyst:  Will need follow-up as outpatient  Patient has urologist in [place]    DVT Prophylaxis: SCD  Diet: ADULT DIET; Regular  Code Status: Full Code  PT/OT Eval Status: At baseline    Dispo -inpatient       Angela Woo MD    Thank you No primary care provider on file. for the opportunity to be involved in this patient's care. If you have any questions or concerns please feel free to contact me at [phone].

## 2022-05-22 NOTE — BRIEF OP NOTE
Brief Postoperative Note      Patient: Marquise Alfred  YOB: 1985  MRN: [id number]    Date of Procedure: 5/22/2022    Pre-Op Diagnosis: acute renal failure with obstructing stone    Post-Op Diagnosis: Same       Procedure(s):  CYSTOSCOPY URETERAL STENT INSERTION RIGHT    Surgeon(s):  Amada Quintana MD    Assistant:  * No surgical staff found *    Anesthesia: General    Estimated Blood Loss (mL): Minimal    Complications: None    Specimens:   * No specimens in log *    Implants:  * No implants in log *      Drains: * No LDAs found *    Findings: mid ureteral stone with hydronephrosis    Plan: keep overnight. Recheck creatinine in the morning. Will need right ureteroscopy, laser lithotripsy in 1-2 weeks. Can be done with TUG or in [place].     Electronically signed by Rosita Chung MD on 5/22/2022 at 1:10 PM

## 2022-05-23 VITALS
OXYGEN SATURATION: 95 % | HEART RATE: 80 BPM | SYSTOLIC BLOOD PRESSURE: 138 MMHG | RESPIRATION RATE: 16 BRPM | TEMPERATURE: 97.9 F | WEIGHT: 315 LBS | HEIGHT: 74 IN | BODY MASS INDEX: 40.43 KG/M2 | DIASTOLIC BLOOD PRESSURE: 83 MMHG

## 2022-05-23 PROBLEM — N17.9 AKI (ACUTE KIDNEY INJURY) (HCC): Status: ACTIVE | Noted: 2022-05-23

## 2022-05-23 PROBLEM — N20.1 URETEROLITHIASIS: Status: ACTIVE | Noted: 2022-05-23

## 2022-05-23 LAB
ANION GAP SERPL CALCULATED.3IONS-SCNC: 8 MMOL/L (ref 3–16)
BASOPHILS ABSOLUTE: 0 K/UL (ref 0–0.2)
BASOPHILS RELATIVE PERCENT: 0.7 %
BUN BLDV-MCNC: 16 MG/DL (ref 7–20)
CALCIUM SERPL-MCNC: 9.1 MG/DL (ref 8.3–10.6)
CHLORIDE BLD-SCNC: 105 MMOL/L (ref 99–110)
CO2: 27 MMOL/L (ref 21–32)
CREAT SERPL-MCNC: 1.1 MG/DL (ref 0.9–1.3)
EOSINOPHILS ABSOLUTE: 0.4 K/UL (ref 0–0.6)
EOSINOPHILS RELATIVE PERCENT: 5.5 %
GFR AFRICAN AMERICAN: >60
GFR NON-AFRICAN AMERICAN: >60
GLUCOSE BLD-MCNC: 97 MG/DL (ref 70–99)
HCT VFR BLD CALC: 35.3 % (ref 40.5–52.5)
HEMOGLOBIN: 12.1 G/DL (ref 13.5–17.5)
LYMPHOCYTES ABSOLUTE: 2.1 K/UL (ref 1–5.1)
LYMPHOCYTES RELATIVE PERCENT: 32.7 %
MCH RBC QN AUTO: 28 PG (ref 26–34)
MCHC RBC AUTO-ENTMCNC: 34.2 G/DL (ref 31–36)
MCV RBC AUTO: 82 FL (ref 80–100)
MONOCYTES ABSOLUTE: 0.4 K/UL (ref 0–1.3)
MONOCYTES RELATIVE PERCENT: 6.7 %
NEUTROPHILS ABSOLUTE: 3.5 K/UL (ref 1.7–7.7)
NEUTROPHILS RELATIVE PERCENT: 54.4 %
PDW BLD-RTO: 14.9 % (ref 12.4–15.4)
PLATELET # BLD: 224 K/UL (ref 135–450)
PMV BLD AUTO: 7.7 FL (ref 5–10.5)
POTASSIUM REFLEX MAGNESIUM: 4.2 MMOL/L (ref 3.5–5.1)
RBC # BLD: 4.3 M/UL (ref 4.2–5.9)
SODIUM BLD-SCNC: 140 MMOL/L (ref 136–145)
WBC # BLD: 6.5 K/UL (ref 4–11)

## 2022-05-23 PROCEDURE — 36415 COLL VENOUS BLD VENIPUNCTURE: CPT

## 2022-05-23 PROCEDURE — 80048 BASIC METABOLIC PNL TOTAL CA: CPT

## 2022-05-23 PROCEDURE — 85025 COMPLETE CBC W/AUTO DIFF WBC: CPT

## 2022-05-23 NOTE — PROGRESS NOTES
Reviewed discharge instructions with patient. Patient had no questions. Patient will follow up with urology in [place].

## 2022-05-23 NOTE — PROGRESS NOTES
Patient has urinated 3 times this evening, states it is still bloody. Reports that he only has pain during urination but not otherwise, declines pain medciation at this time. Took a walk around the unit 3 laps without difficulty. Will continue to monitor. 0500- Patient reports urine is now yellow/clear with only a slightly pink tint.

## 2022-05-24 NOTE — DISCHARGE SUMMARY
Hospital Medicine Discharge Summary      Patient ID: Samul Nissen      Patient's PCP: No primary care provider on file. Admit Date: 5/22/2022     Discharge Date: 5/23/2022      Admitting Physician: Gabi Stewart MD    Discharge Physician: Gabi Stewart MD     Discharge Diagnoses: Active Hospital Problems    Diagnosis Date Noted    Ureterolithiasis [N20.1] 05/23/2022     Priority: Medium    KASSIE (acute kidney injury) ([address] [N17.9] 05/23/2022     Priority: Medium    Hydronephrosis with renal calculous obstruction [N13.2] 05/22/2022     Priority: Medium         The patient was seen and examined on day of discharge and this discharge summary is in conjunction with any daily progress note from day of discharge. Hospital Course: The patient is a 31 y.o. male with medical history of kidney stones who presented to [facility] with 1 day history of severe right-sided flank pain associated with nausea and vomiting. Patient underwent CTAP in the emergency room and was found to have obstructive ureteral stone with right-sided hydronephrosis. Urology was consulted in the emergency room and took patient for cystoscopy with ureteral stent placement. BMP was significant for KASSIE. Kept on IVF overnight and repeat renal function was down to normal. Tolerating PO well. Discharged in stable condition and advised to follow up with urology in 1 week for stent removal and stone manipulation. Also need to follow up on renal cyst in right kidney.        Consults:     IP CONSULT TO UROLOGY  IP CONSULT TO HOSPITALIST    Disposition: home    Discharged Condition: Stable    Code Status: Prior    Activity: activity as tolerated and activity as tolerated and no driving for today    Diet: regular diet    Follow Up: urology in 1 week    Exam:     General appearance: No apparent distress, appears stated age and cooperative.   Lungs: Clear to ascultation, bilaterally without Rales/Wheezes/Rhonchi with good respiratory effort. Heart: Regular rate and rhythm with Normal S1/S2 without  murmurs, rubs or gallops, point of maximum impulse non-displaced  Abdomen: Soft, non-tender or non-distended without rigidity or guarding and positive bowel sounds all four quadrants. Extremities: No clubbing, cyanosis, or edema bilaterally. Skin: Skin color, texture, turgor normal.    Neurologic: Alert and oriented X 3,   grossly non-focal.  Mental status: Alert, oriented, thought content appropriate      Labs: For convenience and continuity at follow-up the following most recent labs are provided:    CBC:   Lab Results   Component Value Date    WBC 6.5 05/23/2022    HGB 12.1 05/23/2022    HCT 35.3 05/23/2022     05/23/2022       RENAL:   Lab Results   Component Value Date     05/23/2022    K 4.2 05/23/2022     05/23/2022    CO2 27 05/23/2022    BUN 16 05/23/2022    CREATININE 1.1 05/23/2022           Discharge Medications:   Discharge Medication List as of 5/23/2022 10:12 AM           Details   Multiple Vitamins-Minerals (THERAPEUTIC MULTIVITAMIN-MINERALS) tablet Take 1 tablet by mouth dailyHistorical Med      ibuprofen (ADVIL;MOTRIN) 400 MG tablet Take 400 mg by mouth every 6 hours as needed for PainHistorical Med      Sassafras Oil OIL Take 1 capsule by mouth Sassafras pillHistorical Med                Time Spent on discharge is less than 30 minutes in the examination, evaluation, counseling and review of medications and discharge plan. Signed:  Tara Grullon MD   5/24/2022      Thank you No primary care provider on file. for the opportunity to be involved in this patient's care. If you have any questions or concerns please feel free to contact me at [phone].

## 2022-05-26 NOTE — OP NOTE
make sure his renal failure resolves. He will need a right ureteroscopy, laser lithotripsy, stone basket  extraction [facility]. Alternatively, if  the patient wants to return to [place], he can meet with his  urologist at home. ESTIMATED BLOOD LOSS:  Zero.         Ysa Acevedo MD    D: 05/26/2022 8:05:31       T: 05/26/2022 9:12:41     [email or web address]  Job#: [id number]     Doc#: [id number]    CC:

## (undated) DEVICE — TOWEL,STOP FLAG GOLD N-W: Brand: MEDLINE

## (undated) DEVICE — CYSTO: Brand: MEDLINE INDUSTRIES, INC.

## (undated) DEVICE — GLOVE ORANGE PI 7   MSG9070